# Patient Record
Sex: MALE | Race: WHITE
[De-identification: names, ages, dates, MRNs, and addresses within clinical notes are randomized per-mention and may not be internally consistent; named-entity substitution may affect disease eponyms.]

---

## 2017-04-07 ENCOUNTER — HOSPITAL ENCOUNTER (EMERGENCY)
Dept: HOSPITAL 54 - ER | Age: 37
Discharge: HOME | End: 2017-04-07
Payer: COMMERCIAL

## 2017-04-07 VITALS
SYSTOLIC BLOOD PRESSURE: 134 MMHG | DIASTOLIC BLOOD PRESSURE: 93 MMHG | HEIGHT: 67 IN | WEIGHT: 180 LBS | BODY MASS INDEX: 28.25 KG/M2
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
## 2017-04-07 DIAGNOSIS — F17.200: ICD-10-CM

## 2017-04-07 DIAGNOSIS — T78.40XA: Primary | ICD-10-CM

## 2017-04-07 PROCEDURE — A4606 OXYGEN PROBE USED W OXIMETER: HCPCS

## 2017-04-07 PROCEDURE — Z7610: HCPCS

## 2017-04-07 PROCEDURE — 99283 EMERGENCY DEPT VISIT LOW MDM: CPT

## 2019-03-26 ENCOUNTER — HOSPITAL ENCOUNTER (EMERGENCY)
Dept: HOSPITAL 54 - ER | Age: 39
LOS: 1 days | Discharge: HOME | End: 2019-03-27
Payer: COMMERCIAL

## 2019-03-26 VITALS — WEIGHT: 185 LBS | HEIGHT: 67 IN | BODY MASS INDEX: 29.03 KG/M2

## 2019-03-26 DIAGNOSIS — F17.200: ICD-10-CM

## 2019-03-26 DIAGNOSIS — Z98.890: ICD-10-CM

## 2019-03-26 DIAGNOSIS — R10.31: Primary | ICD-10-CM

## 2019-03-26 PROCEDURE — 93005 ELECTROCARDIOGRAM TRACING: CPT

## 2019-03-26 PROCEDURE — 96375 TX/PRO/DX INJ NEW DRUG ADDON: CPT

## 2019-03-26 PROCEDURE — 96374 THER/PROPH/DIAG INJ IV PUSH: CPT

## 2019-03-26 PROCEDURE — 36415 COLL VENOUS BLD VENIPUNCTURE: CPT

## 2019-03-26 PROCEDURE — 99284 EMERGENCY DEPT VISIT MOD MDM: CPT

## 2019-03-26 PROCEDURE — 80076 HEPATIC FUNCTION PANEL: CPT

## 2019-03-26 PROCEDURE — 84484 ASSAY OF TROPONIN QUANT: CPT

## 2019-03-26 PROCEDURE — 74177 CT ABD & PELVIS W/CONTRAST: CPT

## 2019-03-26 PROCEDURE — 85025 COMPLETE CBC W/AUTO DIFF WBC: CPT

## 2019-03-26 PROCEDURE — 71045 X-RAY EXAM CHEST 1 VIEW: CPT

## 2019-03-26 PROCEDURE — 83690 ASSAY OF LIPASE: CPT

## 2019-03-26 PROCEDURE — 80048 BASIC METABOLIC PNL TOTAL CA: CPT

## 2019-03-26 PROCEDURE — 86140 C-REACTIVE PROTEIN: CPT

## 2019-03-26 PROCEDURE — 85730 THROMBOPLASTIN TIME PARTIAL: CPT

## 2019-03-26 PROCEDURE — 83605 ASSAY OF LACTIC ACID: CPT

## 2019-03-26 NOTE — NUR
PT AMBULATED TO ER #2 WITH A STEADY GAIT. PT WAS PLACED ON THE MONITOR AND 
CONTINUOUS PULSE OX. PT'S RESP ARE EVEN AND UNLABORED. PT WILL CONTINUE TO BE 
MONITORED.

## 2019-03-27 VITALS — DIASTOLIC BLOOD PRESSURE: 87 MMHG | SYSTOLIC BLOOD PRESSURE: 123 MMHG

## 2019-03-27 LAB
ALBUMIN SERPL BCP-MCNC: 4.1 G/DL (ref 3.4–5)
ALP SERPL-CCNC: 71 U/L (ref 46–116)
ALT SERPL W P-5'-P-CCNC: 25 U/L (ref 12–78)
AST SERPL W P-5'-P-CCNC: 16 U/L (ref 15–37)
BASOPHILS # BLD AUTO: 0.1 /CMM (ref 0–0.2)
BASOPHILS NFR BLD AUTO: 1.7 % (ref 0–2)
BILIRUB DIRECT SERPL-MCNC: 0.1 MG/DL (ref 0–0.2)
BILIRUB SERPL-MCNC: 0.2 MG/DL (ref 0.2–1)
BUN SERPL-MCNC: 14 MG/DL (ref 7–18)
CALCIUM SERPL-MCNC: 9.1 MG/DL (ref 8.5–10.1)
CHLORIDE SERPL-SCNC: 100 MMOL/L (ref 98–107)
CO2 SERPL-SCNC: 27 MMOL/L (ref 21–32)
CREAT SERPL-MCNC: 1 MG/DL (ref 0.6–1.3)
EOSINOPHIL NFR BLD AUTO: 1.4 % (ref 0–6)
GLUCOSE SERPL-MCNC: 102 MG/DL (ref 74–106)
HCT VFR BLD AUTO: 43 % (ref 39–51)
HGB BLD-MCNC: 14.8 G/DL (ref 13.5–17.5)
LIPASE SERPL-CCNC: 178 U/L (ref 73–393)
LYMPHOCYTES NFR BLD AUTO: 2.2 /CMM (ref 0.8–4.8)
LYMPHOCYTES NFR BLD AUTO: 39.7 % (ref 20–44)
MCHC RBC AUTO-ENTMCNC: 34 G/DL (ref 31–36)
MCV RBC AUTO: 91 FL (ref 80–96)
MONOCYTES NFR BLD AUTO: 0.5 /CMM (ref 0.1–1.3)
MONOCYTES NFR BLD AUTO: 8.4 % (ref 2–12)
NEUTROPHILS # BLD AUTO: 2.7 /CMM (ref 1.8–8.9)
NEUTROPHILS NFR BLD AUTO: 48.8 % (ref 43–81)
PLATELET # BLD AUTO: 284 /CMM (ref 150–450)
POTASSIUM SERPL-SCNC: 3.6 MMOL/L (ref 3.5–5.1)
PROT SERPL-MCNC: 7.6 G/DL (ref 6.4–8.2)
RBC # BLD AUTO: 4.79 MIL/UL (ref 4.5–6)
SODIUM SERPL-SCNC: 136 MMOL/L (ref 136–145)
WBC NRBC COR # BLD AUTO: 5.5 K/UL (ref 4.3–11)

## 2019-03-27 RX ADMIN — DEXTROSE MONOHYDRATE ONE MG: 50 INJECTION, SOLUTION INTRAVENOUS at 00:35

## 2019-03-27 RX ADMIN — SODIUM CHLORIDE ONE ML: 9 INJECTION, SOLUTION INTRAVENOUS at 00:35

## 2019-03-27 RX ADMIN — DIATRIZOATE MEGLUMINE AND DIATRIZOATE SODIUM ONE ML: 660; 100 LIQUID ORAL; RECTAL at 01:01

## 2019-03-27 RX ADMIN — FAMOTIDINE ONE MG: 10 INJECTION INTRAVENOUS at 00:37

## 2019-03-27 NOTE — NUR
IV INITIATED RIGHT AC 18G. LABS DRAWN FROM SITE. PHLEBOTOMIST AT BEDSIDE FOR 
COLLECTION. IV INTACT AND PATENT, PT MEDICATED PER MD ORDER

## 2019-03-27 NOTE — NUR
IV removed. Catheter intact and site benign. Pressure and 4x4 applied to site. 
No bleeding noted.Patient discharged to home in stable condition. Written and 
verbal after care instructions given. Patient verbalizes understanding of 
instruction. PT'S FRIEND IS DRIVING PT HOME. VSS.

## 2022-04-09 ENCOUNTER — HOSPITAL ENCOUNTER (EMERGENCY)
Dept: HOSPITAL 54 - ER | Age: 42
Discharge: LEFT BEFORE DISCHARGE | End: 2022-04-09
Payer: COMMERCIAL

## 2022-04-09 DIAGNOSIS — Z53.21: Primary | ICD-10-CM
